# Patient Record
Sex: FEMALE | Race: WHITE | NOT HISPANIC OR LATINO | Employment: FULL TIME | URBAN - METROPOLITAN AREA
[De-identification: names, ages, dates, MRNs, and addresses within clinical notes are randomized per-mention and may not be internally consistent; named-entity substitution may affect disease eponyms.]

---

## 2018-04-26 ENCOUNTER — HOSPITAL ENCOUNTER (EMERGENCY)
Facility: HOSPITAL | Age: 33
Discharge: HOME | End: 2018-04-26
Admitting: EMERGENCY MEDICINE
Payer: COMMERCIAL

## 2018-04-26 VITALS
SYSTOLIC BLOOD PRESSURE: 144 MMHG | RESPIRATION RATE: 16 BRPM | TEMPERATURE: 97.7 F | WEIGHT: 250 LBS | BODY MASS INDEX: 45.73 KG/M2 | OXYGEN SATURATION: 98 % | HEART RATE: 90 BPM | DIASTOLIC BLOOD PRESSURE: 83 MMHG

## 2018-04-26 DIAGNOSIS — S05.02XA ABRASION OF LEFT CORNEA, INITIAL ENCOUNTER: Primary | ICD-10-CM

## 2018-04-26 PROCEDURE — 99283 EMERGENCY DEPT VISIT LOW MDM: CPT

## 2018-04-26 RX ORDER — LEVOTHYROXINE SODIUM 88 UG/1
1 TABLET ORAL DAILY
COMMUNITY
Start: 2014-01-23 | End: 2019-04-30 | Stop reason: SDUPTHER

## 2018-04-26 RX ORDER — CYCLOPENTOLATE HYDROCHLORIDE 10 MG/ML
2 SOLUTION/ DROPS OPHTHALMIC ONCE
Status: COMPLETED | OUTPATIENT
Start: 2018-04-26 | End: 2018-04-26

## 2018-04-26 RX ORDER — PROPARACAINE HYDROCHLORIDE 5 MG/ML
1 SOLUTION/ DROPS OPHTHALMIC ONCE
Status: COMPLETED | OUTPATIENT
Start: 2018-04-26 | End: 2018-04-26

## 2018-04-26 RX ORDER — OFLOXACIN 3 MG/ML
2 SOLUTION/ DROPS OPHTHALMIC 4 TIMES DAILY
Qty: 5 ML | Refills: 0 | Status: SHIPPED | OUTPATIENT
Start: 2018-04-26 | End: 2019-04-30 | Stop reason: ALTCHOICE

## 2018-04-26 RX ADMIN — FLUORESCEIN SODIUM 1 STRIP: 0.6 STRIP OPHTHALMIC at 07:12

## 2018-04-26 RX ADMIN — CYCLOPENTOLATE HYDROCHLORIDE 2 DROP: 10 SOLUTION OPHTHALMIC at 07:26

## 2018-04-26 RX ADMIN — PROPARACAINE HYDROCHLORIDE 1 DROP: 5 SOLUTION/ DROPS OPHTHALMIC at 07:12

## 2018-04-26 NOTE — ED PROVIDER NOTES
Adequate: hears normal conversation without difficulty History  Chief Complaint   Patient presents with    Eye Pain     Pt states "I woke up with something in my eye and its burning "      This is a 40-year-old female patient who is a contact wearer  Woke up this morning with a severe foreign body sensation in burning in her left eye with mild blurred vision or double vision  She has tearful discharge but no purulent discharge  She states she took her contacts out before she went to bed  No headache  No temple pain  No fever no chills  She has never had this before patient has no other symptoms  She is also photophobic  Differential diagnosis includes not limited to conjunctivitis concern for Pseudomonas, corneal ulceration, corneal abrasion  Prior to Admission Medications   Prescriptions Last Dose Informant Patient Reported? Taking?   levothyroxine 88 mcg tablet 4/25/2018 at Unknown time  Yes Yes   Sig: Take 1 tablet by mouth daily      Facility-Administered Medications: None       History reviewed  No pertinent past medical history  History reviewed  No pertinent surgical history  History reviewed  No pertinent family history  I have reviewed and agree with the history as documented  Social History   Substance Use Topics    Smoking status: Never Smoker    Smokeless tobacco: Never Used    Alcohol use Yes      Comment: occasional        Review of Systems   All other systems reviewed and are negative  Physical Exam  ED Triage Vitals [04/26/18 0701]   Temperature Pulse Respirations Blood Pressure SpO2   97 7 °F (36 5 °C) 90 16 144/83 98 %      Temp src Heart Rate Source Patient Position - Orthostatic VS BP Location FiO2 (%)   -- -- -- -- --      Pain Score       Worst Possible Pain           Orthostatic Vital Signs  Vitals:    04/26/18 0701   BP: 144/83   Pulse: 90       Physical Exam   Constitutional: She appears well-developed and well-nourished  HENT:   Head: Normocephalic and atraumatic     Right Ear: External ear normal    Left Ear: External ear normal    Nose: Nose normal    Mouth/Throat: Oropharynx is clear and moist    Eyes: EOM are normal  Pupils are equal, round, and reactive to light  Lids are everted and swept, no foreign bodies found  Right conjunctiva is not injected  Right conjunctiva has no hemorrhage  Left conjunctiva is injected  Left conjunctiva has no hemorrhage  Fundoscopic exam:       The left eye shows no exudate and no hemorrhage  The left eye shows red reflex  Slit lamp exam:       The left eye shows corneal abrasion and fluorescein uptake  The left eye shows no corneal ulcer, no foreign body, no hyphema and no hypopyon  Neck: Normal range of motion  Neck supple  Cardiovascular: Normal rate and regular rhythm  Pulmonary/Chest: Effort normal and breath sounds normal    Abdominal: Soft  Bowel sounds are normal  There is no tenderness  Neurological: She is alert  Skin: Skin is warm  Psychiatric: She has a normal mood and affect  Her behavior is normal    Nursing note and vitals reviewed  ED Medications  Medications   fluorescein sodium sterile ophthalmic strip 1 strip (1 strip Both Eyes Given 4/26/18 0712)   proparacaine (ALCAINE) 0 5 % ophthalmic solution 1 drop (1 drop Both Eyes Given 4/26/18 0712)   cyclopentolate (CYCLOGYL) 1 % ophthalmic solution 2 drop (2 drops Left Eye Given 4/26/18 5131)       Diagnostic Studies  Results Reviewed     None                 No orders to display              Procedures  Procedures       Phone Contacts  ED Phone Contact    ED Course                               MDM  CritCare Time    Disposition  Final diagnoses:   Abrasion of left cornea, initial encounter     Time reflects when diagnosis was documented in both MDM as applicable and the Disposition within this note     Time User Action Codes Description Comment    4/26/2018  7:27 AM Brian Burkett Add [S05  02XA] Abrasion of left cornea, initial encounter       ED Disposition     ED Disposition Condition Comment    Discharge  Farooq Ratel discharge to home/self care  Condition at discharge: Good        Follow-up Information     Follow up With Specialties Details Why Contact Info    Yosef Martinez MD Ophthalmology Schedule an appointment as soon as possible for a visit today  [facility] Simmons   [address] [address]  [phone]          Patient's Medications   Discharge Prescriptions    OFLOXACIN (OCUFLOX) 0 3 % OPHTHALMIC SOLUTION    Administer 2 drops into the left eye 4 (four) times a day       Start Date: 4/26/2018 End Date: --       Order Dose: 2 drops       Quantity: 5 mL    Refills: 0     No discharge procedures on file      ED Provider  Electronically Signed by           Amalia Bueno PA-C  04/26/18 0307

## 2018-04-26 NOTE — DISCHARGE INSTRUCTIONS
Corneal Abrasion   WHAT YOU NEED TO KNOW:   A corneal abrasion is a scratch on the cornea of your eye  The cornea is the clear layer that covers the front of your eye  A small scratch may heal in 1 to 2 days  Deeper or larger scratches may take longer to heal         DISCHARGE INSTRUCTIONS:   Contact your healthcare provider if:   · Your eye pain or vision gets worse  · You have yellow or green drainage from your eye  · You have questions or concerns about your condition or care  Medicines:   · Medicines  may be given in the form of eyedrops or ointment to help prevent an eye infection  You may also be given eye drops to decrease pain  Ask how to take this medicine safely  · Take your medicine as directed  Contact your healthcare provider if you think your medicine is not helping or if you have side effects  Tell him or her if you are allergic to any medicine  Keep a list of the medicines, vitamins, and herbs you take  Include the amounts, and when and why you take them  Bring the list or the pill bottles to follow-up visits  Carry your medicine list with you in case of an emergency  Follow up with your healthcare provider as directed:  Write down your questions so you remember to ask them during your visits  Self-care:   · Do not touch or rub your eye  · Ask your healthcare provider when you can start your normal activities  · Ask your healthcare provider when you can wear your contact lenses  · Wear sunglasses in bright light until your eyes feel better  Help prevent corneal abrasions:   · Remove your contact lenses if your eyes feel dry or irritated  · Wash your hands if you need to touch your eyes or your face  · Trim your child's fingernails so he cannot scratch his eye  · Wear protective eyewear when you work with chemicals, wood, dust, or metal      · Wear protective eyewear when you play sports  · Do not wear your contacts for longer than you should       · Do not wear colored lenses or lenses with shapes on them  These lenses may cause eye damage and vision loss  · Do not wear glitter makeup  Glitter can easily get into your eyes and under contact lenses  · Do not sleep with your contacts in your eyes  © 2017 [address] Information is for End User's use only and may not be sold, redistributed or otherwise used for commercial purposes  All illustrations and images included in CareNotes® are the copyrighted property of A D A Peek , Content Ramen  or Aron Zamora  The above information is an  only  It is not intended as medical advice for individual conditions or treatments  Talk to your doctor, nurse or pharmacist before following any medical regimen to see if it is safe and effective for you

## 2019-04-30 ENCOUNTER — OFFICE VISIT (OUTPATIENT)
Dept: INTERNAL MEDICINE CLINIC | Facility: CLINIC | Age: 34
End: 2019-04-30

## 2019-04-30 VITALS
TEMPERATURE: 98.3 F | HEIGHT: 62 IN | WEIGHT: 286.16 LBS | HEART RATE: 84 BPM | SYSTOLIC BLOOD PRESSURE: 126 MMHG | DIASTOLIC BLOOD PRESSURE: 82 MMHG | BODY MASS INDEX: 52.66 KG/M2

## 2019-04-30 DIAGNOSIS — R53.83 OTHER FATIGUE: Chronic | ICD-10-CM

## 2019-04-30 DIAGNOSIS — E06.3 HYPOTHYROIDISM DUE TO HASHIMOTO'S THYROIDITIS: Chronic | ICD-10-CM

## 2019-04-30 DIAGNOSIS — M25.571 CHRONIC PAIN OF BOTH ANKLES: Chronic | ICD-10-CM

## 2019-04-30 DIAGNOSIS — G89.29 CHRONIC PAIN OF BOTH ANKLES: Chronic | ICD-10-CM

## 2019-04-30 DIAGNOSIS — E66.01 CLASS 3 SEVERE OBESITY DUE TO EXCESS CALORIES WITHOUT SERIOUS COMORBIDITY WITH BODY MASS INDEX (BMI) OF 50.0 TO 59.9 IN ADULT (HCC): Chronic | ICD-10-CM

## 2019-04-30 DIAGNOSIS — M25.572 CHRONIC PAIN OF BOTH ANKLES: Chronic | ICD-10-CM

## 2019-04-30 DIAGNOSIS — Z23 NEED FOR TDAP VACCINATION: Primary | ICD-10-CM

## 2019-04-30 DIAGNOSIS — E03.8 HYPOTHYROIDISM DUE TO HASHIMOTO'S THYROIDITIS: Chronic | ICD-10-CM

## 2019-04-30 PROBLEM — E66.813 CLASS 3 SEVERE OBESITY DUE TO EXCESS CALORIES WITHOUT SERIOUS COMORBIDITY WITH BODY MASS INDEX (BMI) OF 50.0 TO 59.9 IN ADULT (HCC): Chronic | Status: ACTIVE | Noted: 2019-04-30

## 2019-04-30 PROCEDURE — 3008F BODY MASS INDEX DOCD: CPT | Performed by: INTERNAL MEDICINE

## 2019-04-30 PROCEDURE — 99204 OFFICE O/P NEW MOD 45 MIN: CPT | Performed by: INTERNAL MEDICINE

## 2019-04-30 PROCEDURE — 90715 TDAP VACCINE 7 YRS/> IM: CPT | Performed by: INTERNAL MEDICINE

## 2019-04-30 PROCEDURE — 90471 IMMUNIZATION ADMIN: CPT | Performed by: INTERNAL MEDICINE

## 2019-04-30 PROCEDURE — 1036F TOBACCO NON-USER: CPT | Performed by: INTERNAL MEDICINE

## 2019-04-30 PROCEDURE — 3725F SCREEN DEPRESSION PERFORMED: CPT | Performed by: INTERNAL MEDICINE

## 2019-04-30 RX ORDER — LEVOTHYROXINE SODIUM 88 UG/1
88 TABLET ORAL DAILY
Qty: 90 TABLET | Refills: 0 | Status: SHIPPED | OUTPATIENT
Start: 2019-04-30 | End: 2021-03-01 | Stop reason: SDUPTHER

## 2019-04-30 RX ORDER — GABAPENTIN 300 MG/1
300 CAPSULE ORAL 2 TIMES DAILY
Qty: 60 CAPSULE | Refills: 0 | Status: SHIPPED | OUTPATIENT
Start: 2019-04-30 | End: 2021-03-01 | Stop reason: SDUPTHER

## 2019-05-07 ENCOUNTER — TELEPHONE (OUTPATIENT)
Dept: INTERNAL MEDICINE CLINIC | Facility: CLINIC | Age: 34
End: 2019-05-07

## 2019-06-12 ENCOUNTER — TELEPHONE (OUTPATIENT)
Dept: INTERNAL MEDICINE CLINIC | Facility: CLINIC | Age: 34
End: 2019-06-12

## 2019-09-11 ENCOUNTER — TELEPHONE (OUTPATIENT)
Dept: FAMILY MEDICINE CLINIC | Facility: CLINIC | Age: 34
End: 2019-09-11

## 2019-09-11 NOTE — TELEPHONE ENCOUNTER
Pt no showed to her appt today 9/11/19 and per Dr Rodriguez Stager he states that he would not like to have her re-scheduled at all due to her past history of frequent no shows within [facility]  Can you please flag this in patients chart?

## 2019-09-18 NOTE — TELEPHONE ENCOUNTER
Patient No Show letter sent today  Must give notice before dismissal  Refer to letter if any questions

## 2021-03-01 ENCOUNTER — OFFICE VISIT (OUTPATIENT)
Dept: FAMILY MEDICINE CLINIC | Facility: CLINIC | Age: 36
End: 2021-03-01
Payer: COMMERCIAL

## 2021-03-01 VITALS
OXYGEN SATURATION: 98 % | DIASTOLIC BLOOD PRESSURE: 94 MMHG | BODY MASS INDEX: 50.05 KG/M2 | RESPIRATION RATE: 16 BRPM | HEART RATE: 88 BPM | WEIGHT: 272 LBS | SYSTOLIC BLOOD PRESSURE: 130 MMHG | HEIGHT: 62 IN | TEMPERATURE: 97.6 F

## 2021-03-01 DIAGNOSIS — E06.3 HYPOTHYROIDISM DUE TO HASHIMOTO'S THYROIDITIS: Primary | Chronic | ICD-10-CM

## 2021-03-01 DIAGNOSIS — E66.01 CLASS 3 SEVERE OBESITY DUE TO EXCESS CALORIES WITHOUT SERIOUS COMORBIDITY WITH BODY MASS INDEX (BMI) OF 50.0 TO 59.9 IN ADULT (HCC): Chronic | ICD-10-CM

## 2021-03-01 DIAGNOSIS — E03.8 HYPOTHYROIDISM DUE TO HASHIMOTO'S THYROIDITIS: Primary | Chronic | ICD-10-CM

## 2021-03-01 DIAGNOSIS — M25.572 CHRONIC PAIN OF BOTH ANKLES: Chronic | ICD-10-CM

## 2021-03-01 DIAGNOSIS — M25.571 CHRONIC PAIN OF BOTH ANKLES: Chronic | ICD-10-CM

## 2021-03-01 DIAGNOSIS — G89.29 CHRONIC PAIN OF BOTH ANKLES: Chronic | ICD-10-CM

## 2021-03-01 PROCEDURE — 3008F BODY MASS INDEX DOCD: CPT | Performed by: FAMILY MEDICINE

## 2021-03-01 PROCEDURE — 1036F TOBACCO NON-USER: CPT | Performed by: FAMILY MEDICINE

## 2021-03-01 PROCEDURE — 36415 COLL VENOUS BLD VENIPUNCTURE: CPT | Performed by: FAMILY MEDICINE

## 2021-03-01 PROCEDURE — 3725F SCREEN DEPRESSION PERFORMED: CPT | Performed by: FAMILY MEDICINE

## 2021-03-01 PROCEDURE — 99204 OFFICE O/P NEW MOD 45 MIN: CPT | Performed by: FAMILY MEDICINE

## 2021-03-01 RX ORDER — GABAPENTIN 300 MG/1
300 CAPSULE ORAL 2 TIMES DAILY
Qty: 180 CAPSULE | Refills: 1 | Status: SHIPPED | OUTPATIENT
Start: 2021-03-01 | End: 2022-01-23

## 2021-03-01 RX ORDER — LEVOTHYROXINE SODIUM 88 UG/1
88 TABLET ORAL DAILY
Qty: 90 TABLET | Refills: 1 | Status: SHIPPED | OUTPATIENT
Start: 2021-03-01 | End: 2022-01-23
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
## 2021-03-02 DIAGNOSIS — E06.3 HYPOTHYROIDISM DUE TO HASHIMOTO'S THYROIDITIS: Primary | Chronic | ICD-10-CM

## 2021-03-02 DIAGNOSIS — E03.8 HYPOTHYROIDISM DUE TO HASHIMOTO'S THYROIDITIS: Primary | Chronic | ICD-10-CM

## 2021-03-02 LAB
T4 FREE SERPL-MCNC: 1.2 NG/DL (ref 0.8–1.8)
TSH SERPL-ACNC: 4.57 MIU/L

## 2021-05-06 ENCOUNTER — TELEPHONE (OUTPATIENT)
Dept: FAMILY MEDICINE CLINIC | Facility: CLINIC | Age: 36
End: 2021-05-06

## 2021-05-06 NOTE — TELEPHONE ENCOUNTER
Call patient and recommend she increase gabapentin 300 mg to t i d  and may take ibuprofen or naproxen OTC  Recommend referral to Orthopedics

## 2021-05-06 NOTE — TELEPHONE ENCOUNTER
Pt states that she has been taking gabapentin for her ankle pain, but now she is on her feet most of the day due to her job, and the gabapentin is not working  She is asking if there is anything else you can prescribe  Pt uses [facility] [address] in [place] [address]  Please advise your recommendations  Thank you

## 2021-05-07 NOTE — TELEPHONE ENCOUNTER
Pt verbalized understanding of recommendations  No further questions/concerns  Pt declines referral to orthopedics at this time  She will call back if recommendations don't help

## 2022-04-05 ENCOUNTER — OFFICE VISIT (OUTPATIENT)
Dept: FAMILY MEDICINE CLINIC | Facility: CLINIC | Age: 37
End: 2022-04-05
Payer: COMMERCIAL

## 2022-04-05 VITALS
TEMPERATURE: 98.1 F | WEIGHT: 272 LBS | SYSTOLIC BLOOD PRESSURE: 132 MMHG | OXYGEN SATURATION: 98 % | HEIGHT: 60 IN | BODY MASS INDEX: 53.4 KG/M2 | RESPIRATION RATE: 16 BRPM | HEART RATE: 80 BPM | DIASTOLIC BLOOD PRESSURE: 86 MMHG

## 2022-04-05 DIAGNOSIS — G89.29 CHRONIC PAIN OF BOTH ANKLES: ICD-10-CM

## 2022-04-05 DIAGNOSIS — M25.572 CHRONIC PAIN OF BOTH ANKLES: ICD-10-CM

## 2022-04-05 DIAGNOSIS — E66.01 CLASS 3 SEVERE OBESITY DUE TO EXCESS CALORIES WITHOUT SERIOUS COMORBIDITY WITH BODY MASS INDEX (BMI) OF 50.0 TO 59.9 IN ADULT (HCC): ICD-10-CM

## 2022-04-05 DIAGNOSIS — E06.3 HYPOTHYROIDISM DUE TO HASHIMOTO'S THYROIDITIS: Primary | ICD-10-CM

## 2022-04-05 DIAGNOSIS — M25.571 CHRONIC PAIN OF BOTH ANKLES: ICD-10-CM

## 2022-04-05 DIAGNOSIS — E03.8 HYPOTHYROIDISM DUE TO HASHIMOTO'S THYROIDITIS: Primary | ICD-10-CM

## 2022-04-05 PROCEDURE — 3725F SCREEN DEPRESSION PERFORMED: CPT | Performed by: FAMILY MEDICINE

## 2022-04-05 PROCEDURE — 3008F BODY MASS INDEX DOCD: CPT | Performed by: FAMILY MEDICINE

## 2022-04-05 PROCEDURE — 99214 OFFICE O/P EST MOD 30 MIN: CPT | Performed by: FAMILY MEDICINE

## 2022-04-05 PROCEDURE — 1036F TOBACCO NON-USER: CPT | Performed by: FAMILY MEDICINE

## 2022-04-05 RX ORDER — GABAPENTIN 300 MG/1
300 CAPSULE ORAL 3 TIMES DAILY
Qty: 270 CAPSULE | Refills: 1 | Status: SHIPPED | OUTPATIENT
Start: 2022-04-05

## 2022-04-05 RX ORDER — MELOXICAM 15 MG/1
15 TABLET ORAL DAILY
Qty: 30 TABLET | Refills: 5 | Status: SHIPPED | OUTPATIENT
Start: 2022-04-05
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
## 2022-09-21 ENCOUNTER — TELEPHONE (OUTPATIENT)
Dept: FAMILY MEDICINE CLINIC | Facility: CLINIC | Age: 37
End: 2022-09-21

## 2022-09-21 ENCOUNTER — OFFICE VISIT (OUTPATIENT)
Dept: OBGYN CLINIC | Facility: CLINIC | Age: 37
End: 2022-09-21
Payer: COMMERCIAL

## 2022-09-21 ENCOUNTER — APPOINTMENT (OUTPATIENT)
Dept: RADIOLOGY | Facility: AMBULARY SURGERY CENTER | Age: 37
End: 2022-09-21
Attending: ORTHOPAEDIC SURGERY
Payer: COMMERCIAL

## 2022-09-21 VITALS
WEIGHT: 272 LBS | SYSTOLIC BLOOD PRESSURE: 123 MMHG | BODY MASS INDEX: 53.4 KG/M2 | HEART RATE: 73 BPM | HEIGHT: 60 IN | DIASTOLIC BLOOD PRESSURE: 86 MMHG

## 2022-09-21 DIAGNOSIS — M19.172 POST-TRAUMATIC ARTHRITIS OF LEFT ANKLE: Primary | ICD-10-CM

## 2022-09-21 DIAGNOSIS — M25.572 PAIN, JOINT, ANKLE AND FOOT, LEFT: ICD-10-CM

## 2022-09-21 PROCEDURE — 99203 OFFICE O/P NEW LOW 30 MIN: CPT | Performed by: ORTHOPAEDIC SURGERY

## 2022-09-21 PROCEDURE — 73610 X-RAY EXAM OF ANKLE: CPT

## 2022-09-21 NOTE — PATIENT INSTRUCTIONS
Today, We recommended a brace/prosthesis for you  [facility] certified pedorthotists make orthotics but not braces or prosthesis  Below are the companies in the area that make these, Please call ahead for an appointment and to ensure the company accepts your insurance  Make sure to bring the prescription given to you in the office today to the company for the brace/prosthesis  [facility]  [address]  [address] [facility] Yan[address]qu[address] Phone: [phone]  [place] Fax: [phone]    [address]  [address]  [place], [address]  (By Appointment Only)  Directions  [address] [facility], [address] [place] [address] [address] [place]  [place] Phone: [phone]  [place] Fax: [address] [facility] Location  [address]   [address] [facility]  [phone] (fax)    [facility]  [address] [facility], [address] [place] [address],[address]  [place], [facility]  [phone] (fax)    [facility]  [address]  [phone] (fax)    [facility] Location  [address] [place] [address]  [address]  [phone] (fax)    [place] [address] , [address], Gabrielle Sierra   [phone] (fax)
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
## 2022-09-27 PROBLEM — M19.172 POST-TRAUMATIC ARTHRITIS OF LEFT ANKLE: Status: ACTIVE | Noted: 2022-09-27

## 2022-09-27 PROBLEM — Z87.81 HISTORY OF FRACTURE OF LEFT ANKLE: Status: ACTIVE | Noted: 2022-09-27

## 2025-01-02 ENCOUNTER — OFFICE VISIT (OUTPATIENT)
Dept: URGENT CARE | Age: 40
End: 2025-01-02
Payer: COMMERCIAL

## 2025-01-02 VITALS
HEART RATE: 94 BPM | RESPIRATION RATE: 18 BRPM | DIASTOLIC BLOOD PRESSURE: 78 MMHG | TEMPERATURE: 97.4 F | WEIGHT: 290 LBS | BODY MASS INDEX: 56.64 KG/M2 | SYSTOLIC BLOOD PRESSURE: 116 MMHG | OXYGEN SATURATION: 98 %

## 2025-01-02 DIAGNOSIS — H10.32 ACUTE CONJUNCTIVITIS OF LEFT EYE, UNSPECIFIED ACUTE CONJUNCTIVITIS TYPE: Primary | ICD-10-CM

## 2025-01-02 PROCEDURE — 99213 OFFICE O/P EST LOW 20 MIN: CPT | Performed by: EMERGENCY MEDICINE

## 2025-01-02 RX ORDER — MOXIFLOXACIN 5 MG/ML
1 SOLUTION/ DROPS OPHTHALMIC 3 TIMES DAILY
Qty: 3 ML | Refills: 0 | Status: SHIPPED | OUTPATIENT
Start: 2025-01-02

## 2025-01-02 NOTE — PROGRESS NOTES
[facility]        NAME: Krystal Crouthers Schoen is a 39 y.o. female  : 1985    MRN: [id number]  DATE: 2025  TIME: 3:27 PM    Assessment and Plan   Acute conjunctivitis of left eye, unspecified acute conjunctivitis type [H10.32]  1. Acute conjunctivitis of left eye, unspecified acute conjunctivitis type  moxifloxacin (VIGAMOX) 0.5 % ophthalmic solution            Patient Instructions     Use antibiotic drops as prescribed for 7 days.  Apply cold compresses  Avoid touching eyes  Wash hands frequently  Change pillowcases daily  Follow up with eye doctor  Follow up with PCP in 3-5 days.  Proceed to  ER if symptoms worsen.    If tests are performed, our office will contact you with results only if changes need to made to the care plan discussed with you at the visit. [facility]    Chief Complaint     Chief Complaint   Patient presents with    Conjunctivitis     Left eye  Woke up with crusting on eye, started at work yesterday          History of Present Illness       Patient is a 38 yo female with no significant PMH presenting in the clinic today for eye redness which began this morning. Admits left eye redness, left eye discharge, and left eye pruritus. Denies headache, dizziness, eye pain, double/blurred vision, visual changes, photophobia, foreign body sensation, neck pain, n/v, fever, chills, chest pain, and SOB. Admits the use of saline eye drops for sx management. 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 Admits the use of daily corrective contact lenses.         Review of Systems   Review of Systems   Constitutional:  Negative for chills and fever.   HENT:  Negative for ear pain, sore throat and trouble swallowing.    Eyes:  Positive for discharge, redness and itching. Negative for photophobia, pain and visual disturbance.   Respiratory:  Negative for shortness of breath.    Cardiovascular:  Negative  for chest pain.   Gastrointestinal:  Negative for nausea and vomiting.   Musculoskeletal:  Negative for neck pain.   Skin:  Negative for rash.   Neurological:  Negative for dizziness and headaches.         Current Medications       Current Outpatient Medications:     gabapentin (NEURONTIN) 300 mg capsule, Take 1 capsule (300 mg total) by mouth 3 (three) times a day, Disp: 270 capsule, Rfl: 1    levothyroxine 88 mcg tablet, TAKE ONE TABLET BY MOUTH DAILY, Disp: 90 tablet, Rfl: 0    meloxicam (Mobic) 15 mg tablet, Take 1 tablet (15 mg total) by mouth daily (Patient not taking: Reported on 9/21/2022), Disp: 30 tablet, Rfl: 5    moxifloxacin (VIGAMOX) 0.5 % ophthalmic solution, Administer 1 drop into the left eye 3 (three) times a day, Disp: 3 mL, Rfl: 0    Current Allergies     Allergies as of 01/02/2025    (No Known Allergies)            The following portions of the patient's history were reviewed and updated as appropriate: allergies, current medications, past family history, past medical history, past social history, past surgical history and problem list.     Past Medical History:   Diagnosis Date    Class 3 severe obesity due to excess calories without serious comorbidity with body mass index (BMI) of 50.0 to 59.9 in adult ([facility]) 4/30/2019    Disease of thyroid gland     Hypothyroidism    Post-traumatic arthritis of left ankle 9/27/2022       Past Surgical History:   Procedure Laterality Date    ANKLE SURGERY Bilateral 2014    ankle reconstructive surgery in 2014       Family History   Problem Relation Age of Onset    Cancer Mother         Uterine cancer    Diabetes Mother         Type 2    Hypertension Father     No Known Problems Brother     No Known Problems Son     No Known Problems Daughter     No Known Problems Sister     No Known Problems Sister     No Known Problems Sister     No Known Problems Sister     No Known Problems Brother          Medications have been verified.        Objective   /78    Pulse 94   Temp (!) 97.4 °F (36.3 °C)   Resp 18   Wt 132 kg (290 lb)   SpO2 98%   BMI 56.64 kg/m²        Physical Exam     Physical Exam  Vitals reviewed.   Constitutional:       General: She is not in acute distress.     Appearance: Normal appearance. She is normal weight. She is not ill-appearing.   HENT:      Head: Normocephalic.      Nose: Nose normal.      Mouth/Throat:      Mouth: Mucous membranes are moist.   Eyes:      General: Lids are normal. No allergic shiner.        Right eye: No discharge or hordeolum.         Left eye: No discharge or hordeolum.      Extraocular Movements: Extraocular movements intact.      Right eye: No nystagmus.      Left eye: No nystagmus.      Conjunctiva/sclera:      Right eye: Right conjunctiva is not injected. No chemosis.     Left eye: Left conjunctiva is injected. No chemosis.     Pupils: Pupils are equal, round, and reactive to light.   Cardiovascular:      Rate and Rhythm: Normal rate and regular rhythm.      Pulses: Normal pulses.      Heart sounds: Normal heart sounds. No murmur heard.     No friction rub. No gallop.   Pulmonary:      Effort: Pulmonary effort is normal.      Breath sounds: Normal breath sounds. No wheezing, rhonchi or rales.   Musculoskeletal:      Cervical back: Normal range of motion and neck supple.   Skin:     General: Skin is warm.      Findings: No rash.   Neurological:      Mental Status: She is alert.   Psychiatric:         Mood and Affect: Mood normal.         Behavior: Behavior normal.

## 2025-01-02 NOTE — LETTER
January 2, 2025     Patient: Krystal Crouthers Schoen   YOB: 1985   Date of Visit: 1/2/2025       To Whom it May Concern:    Krystal Crouthers Schoen was seen in my clinic on 1/2/2025. She may return to work on 1/4/2025 .    If you have any questions or concerns, please don't hesitate to call.         Sincerely,          Gracy Hernandez PA-C        CC: No Recipients

## 2025-01-02 NOTE — PATIENT INSTRUCTIONS
Use antibiotic drops as prescribed for 7 days.  Apply cold compresses  Avoid touching eyes  Wash hands frequently  Change pillowcases daily  Follow up with eye doctor  Follow up with PCP in 3-5 days.  Proceed to ER if symptoms worsen.